# Patient Record
Sex: FEMALE | Race: WHITE | Employment: UNEMPLOYED | URBAN - METROPOLITAN AREA
[De-identification: names, ages, dates, MRNs, and addresses within clinical notes are randomized per-mention and may not be internally consistent; named-entity substitution may affect disease eponyms.]

---

## 2017-05-03 ENCOUNTER — APPOINTMENT (OUTPATIENT)
Dept: GENERAL RADIOLOGY | Age: 27
End: 2017-05-03
Attending: NURSE PRACTITIONER
Payer: SELF-PAY

## 2017-05-03 ENCOUNTER — HOSPITAL ENCOUNTER (EMERGENCY)
Age: 27
Discharge: HOME | End: 2017-05-03
Payer: SELF-PAY

## 2017-05-03 VITALS
WEIGHT: 210 LBS | DIASTOLIC BLOOD PRESSURE: 94 MMHG | RESPIRATION RATE: 20 BRPM | BODY MASS INDEX: 34.99 KG/M2 | TEMPERATURE: 97.4 F | HEIGHT: 65 IN | OXYGEN SATURATION: 100 % | SYSTOLIC BLOOD PRESSURE: 134 MMHG | HEART RATE: 99 BPM

## 2017-05-03 DIAGNOSIS — S61.511A LACERATION OF WRIST, RIGHT, INITIAL ENCOUNTER: Primary | ICD-10-CM

## 2017-05-03 PROCEDURE — 73110 X-RAY EXAM OF WRIST: CPT

## 2017-05-03 PROCEDURE — 77030002916 HC SUT ETHLN J&J -A

## 2017-05-03 PROCEDURE — 75810000293 HC SIMP/SUPERF WND  RPR

## 2017-05-03 PROCEDURE — 99282 EMERGENCY DEPT VISIT SF MDM: CPT

## 2017-05-03 PROCEDURE — 74011250637 HC RX REV CODE- 250/637: Performed by: NURSE PRACTITIONER

## 2017-05-03 RX ORDER — IBUPROFEN 800 MG/1
800 TABLET ORAL
Status: COMPLETED | OUTPATIENT
Start: 2017-05-03 | End: 2017-05-03

## 2017-05-03 RX ORDER — CEPHALEXIN 500 MG/1
500 CAPSULE ORAL 2 TIMES DAILY
Qty: 10 CAP | Refills: 0 | Status: SHIPPED | OUTPATIENT
Start: 2017-05-03 | End: 2017-05-08

## 2017-05-03 RX ADMIN — IBUPROFEN 800 MG: 800 TABLET ORAL at 16:14

## 2017-05-03 NOTE — LETTER
[facility]77 [facility] [place] [address] [id number] [phone] Work/School Note Date: 5/3/2017 To Whom It May concern: 
 
Dallas Brain was seen and treated today in the emergency room by the following provider(s): 
Attending Provider: Roseanna Sood MD 
Nurse Practitioner: ANDREW Aguilar. Dallas Brain was seen in the Emergency Department 05/03/2017.  
 
Sincerely, 
 
 
 
 
ANDREW Aguilar

## 2017-05-03 NOTE — ED PROVIDER NOTES
HPI Comments: Patient presents with laceration to her right wrist. She states she was trying to open a window when it broke and her hand went through the glass. She has laceration to right wrist bleeding controlled. She denies numbness and tingling in her fingers. She has full range of motion without difficulty in all fingers. Unknown when her last tetanus shot was. Patient is a 27 y.o. female presenting with skin laceration. The history is provided by the patient. Laceration    The incident occurred 1 to 2 hours ago. The laceration is located on the right arm. The laceration is 1 cm in size. The injury mechanism is broken glass. Foreign body present: unknown. The pain is at a severity of 5/10. The pain is mild. The pain has been fluctuating since onset. Pertinent negatives include no numbness, no tingling, no weakness, no coolness and no discoloration. It is unknown when the patient last had a tetanus shot. Past Medical History:   Diagnosis Date    Acute pancreatitis     Anxiety     Placenta abruptio     Occurred with her last pregnancy; Pt reports bleeding x 5 months during her pregnancy; Required blood transfusion.  Polysubstance abuse     marijuana and methamphetamine use    Tobacco dependence     UTI (urinary tract infection)        Past Surgical History:   Procedure Laterality Date    HX  SECTION      x4    HX CHOLECYSTECTOMY      HX TONSILLECTOMY      HX TUBAL LIGATION           Family History:   Problem Relation Age of Onset    Diabetes Other     Clotting Disorder Other      History of blood clot in mother and maternal uncle.  Hypertension Other        Social History     Social History    Marital status: SINGLE     Spouse name: N/A    Number of children: N/A    Years of education: N/A     Occupational History    Not on file.      Social History Main Topics    Smoking status: Current Every Day Smoker     Packs/day: 1.00     Years: 9.00     Types: Cigarettes    Smokeless tobacco: Never Used    Alcohol use Yes      Comment: Reports occasional use    Drug use: 1.00 per week     Special: Marijuana, Methamphetamines    Sexual activity: Yes     Partners: Male     Birth control/ protection: Surgical     Other Topics Concern    Not on file     Social History Narrative         ALLERGIES: Macrobid [nitrofurantoin monohyd/m-cryst]    Review of Systems   Constitutional: Negative for fever. Respiratory: Negative for cough and wheezing. Cardiovascular: Negative for chest pain. Gastrointestinal: Negative for abdominal pain. Skin: Positive for wound. Neurological: Negative for dizziness, tingling, weakness and numbness. Psychiatric/Behavioral: Negative for suicidal ideas. The patient is nervous/anxious. Vitals:    05/03/17 1548   BP: (!) 134/94   Pulse: 99   Resp: 20   Temp: 97.4 °F (36.3 °C)   SpO2: 100%   Weight: 95.3 kg (210 lb)   Height: 5' 5\" (1.651 m)            Physical Exam   Constitutional: She is oriented to person, place, and time. No distress. Cardiovascular: Normal rate, regular rhythm and normal heart sounds. No murmur heard. Pulmonary/Chest: Effort normal and breath sounds normal.   Musculoskeletal:        Right wrist: She exhibits tenderness and laceration. She exhibits normal range of motion, no swelling, no effusion and no deformity. Arms:       Right hand: Normal. Normal sensation noted. Normal strength noted. Neurological: She is alert and oriented to person, place, and time. No cranial nerve deficit. Coordination normal.   Skin: She is not diaphoretic. Psychiatric: She has a normal mood and affect.  Her behavior is normal.   Nursing note and vitals reviewed.       XR WRIST RT AP/LAT/OBL MIN 3V (Final result) Result time: 05/03/17 16:44:44     Final result by Gordon Corona MD (05/03/17 16:44:44)     Impression:     Impression:  No evidence of acute injury.         Narrative:     Exam:  Right wrist radiographs    History:  pain, laceration foreign body?, 26 years Female laceration to right  wrist after bumping a window out    Comparison: None available    Findings:  No evidence of acute fracture or dislocation.  Normal alignment,  joint spaces preserved.  Normal mineralization.  Visualized soft tissues  otherwise unremarkable. MDM  Number of Diagnoses or Management Options  Laceration of wrist, right, initial encounter: new and does not require workup  Diagnosis management comments: No foreign body noted on xray. Sutures placed. Dressing applied. Patient given ibuprofen for pain. Patient discharged with prescription for keflex. Return in 7 days to have sutures removed. Amount and/or Complexity of Data Reviewed  Tests in the radiology section of CPT®: ordered and reviewed  Tests in the medicine section of CPT®: ordered and reviewed    Patient Progress  Patient progress: stable    ED Course       Wound Repair  Date/Time: 5/3/2017 5:24 PM  Performed by: [address] [place] [address] [address] provider: angela  Pre-procedure re-eval: Immediately prior to the procedure, the patient was reevaluated and found suitable for the planned procedure and any planned medications. Time out: Immediately prior to the procedure a time out was called to verify the correct patient, procedure, equipment, staff and marking as appropriate. .  Location details: right wrist  Wound length:2.5 cm or less  Anesthesia: local infiltration    Anesthesia:  Anesthesia: local infiltration  Local Anesthetic: lidocaine 1% without epinephrine   Anesthetic total: 2 mL  Foreign bodies: no foreign bodies  Irrigation solution: saline  Irrigation method: syringe  Debridement: none  Skin closure: 4-0 nylon  Number of sutures: 3  Technique: simple  Approximation: close  Patient tolerance: Patient tolerated the procedure well with no immediate complications  My total time at bedside, performing this procedure was 1-15 minutes.

## 2018-07-30 ENCOUNTER — HOSPITAL ENCOUNTER (EMERGENCY)
Age: 28
Discharge: HOME | End: 2018-07-30
Attending: EMERGENCY MEDICINE
Payer: SELF-PAY

## 2018-07-30 VITALS
TEMPERATURE: 97.9 F | BODY MASS INDEX: 30.82 KG/M2 | OXYGEN SATURATION: 99 % | WEIGHT: 185 LBS | RESPIRATION RATE: 20 BRPM | HEART RATE: 99 BPM | DIASTOLIC BLOOD PRESSURE: 99 MMHG | SYSTOLIC BLOOD PRESSURE: 165 MMHG | HEIGHT: 65 IN

## 2018-07-30 DIAGNOSIS — N75.1 BARTHOLIN'S GLAND ABSCESS: Primary | ICD-10-CM

## 2018-07-30 LAB
APPEARANCE UR: NORMAL
BILIRUB UR QL: NEGATIVE
COLOR UR: YELLOW
GLUCOSE UR STRIP.AUTO-MCNC: NEGATIVE MG/DL
HCG UR QL: NEGATIVE
HGB UR QL STRIP: NEGATIVE
KETONES UR QL STRIP.AUTO: NEGATIVE MG/DL
LEUKOCYTE ESTERASE UR QL STRIP.AUTO: NEGATIVE
NITRITE UR QL STRIP.AUTO: NEGATIVE
PH UR STRIP: 6.5 [PH] (ref 5–9)
PROT UR STRIP-MCNC: NEGATIVE MG/DL
SP GR UR REFRACTOMETRY: 1.02 (ref 1–1.02)
UROBILINOGEN UR QL STRIP.AUTO: 0.2 EU/DL (ref 0.2–1)

## 2018-07-30 PROCEDURE — 75810000113 HC INC/DRN BARHOLIN GLND ABCESS: Performed by: PHYSICIAN ASSISTANT

## 2018-07-30 PROCEDURE — 87077 CULTURE AEROBIC IDENTIFY: CPT

## 2018-07-30 PROCEDURE — 74011250637 HC RX REV CODE- 250/637: Performed by: PHYSICIAN ASSISTANT

## 2018-07-30 PROCEDURE — 74011250636 HC RX REV CODE- 250/636: Performed by: PHYSICIAN ASSISTANT

## 2018-07-30 PROCEDURE — 87186 SC STD MICRODIL/AGAR DIL: CPT | Performed by: PHYSICIAN ASSISTANT

## 2018-07-30 PROCEDURE — 96372 THER/PROPH/DIAG INJ SC/IM: CPT | Performed by: PHYSICIAN ASSISTANT

## 2018-07-30 PROCEDURE — 99284 EMERGENCY DEPT VISIT MOD MDM: CPT | Performed by: PHYSICIAN ASSISTANT

## 2018-07-30 PROCEDURE — 87205 SMEAR GRAM STAIN: CPT | Performed by: PHYSICIAN ASSISTANT

## 2018-07-30 PROCEDURE — 77030012142 HC CATH BARTH WORD COOP -B

## 2018-07-30 PROCEDURE — 81025 URINE PREGNANCY TEST: CPT | Performed by: PHYSICIAN ASSISTANT

## 2018-07-30 PROCEDURE — 81003 URINALYSIS AUTO W/O SCOPE: CPT | Performed by: PHYSICIAN ASSISTANT

## 2018-07-30 RX ORDER — KETOROLAC TROMETHAMINE 30 MG/ML
60 INJECTION, SOLUTION INTRAMUSCULAR; INTRAVENOUS
Status: COMPLETED | OUTPATIENT
Start: 2018-07-30 | End: 2018-07-30

## 2018-07-30 RX ORDER — ONDANSETRON 4 MG/1
4 TABLET, ORALLY DISINTEGRATING ORAL
Status: COMPLETED | OUTPATIENT
Start: 2018-07-30 | End: 2018-07-30

## 2018-07-30 RX ORDER — DOXYCYCLINE HYCLATE 100 MG
100 TABLET ORAL 2 TIMES DAILY
Qty: 20 TAB | Refills: 0 | Status: SHIPPED | OUTPATIENT
Start: 2018-07-30 | End: 2018-08-01 | Stop reason: ALTCHOICE

## 2018-07-30 RX ORDER — TRAMADOL HYDROCHLORIDE 50 MG/1
50 TABLET ORAL
Qty: 15 TAB | Refills: 0 | Status: SHIPPED | OUTPATIENT
Start: 2018-07-30

## 2018-07-30 RX ORDER — IBUPROFEN 600 MG/1
600 TABLET ORAL
Qty: 20 TAB | Refills: 0 | Status: SHIPPED | OUTPATIENT
Start: 2018-07-30

## 2018-07-30 RX ADMIN — ONDANSETRON 4 MG: 4 TABLET, ORALLY DISINTEGRATING ORAL at 11:50

## 2018-07-30 RX ADMIN — KETOROLAC TROMETHAMINE 60 MG: 30 INJECTION, SOLUTION INTRAMUSCULAR at 11:50

## 2018-07-30 NOTE — ED PROVIDER NOTES
HPI Comments: Patient is here with a boil to her left perineal area that started over a week ago. She has had one there in the past that has been lanced. She states \"they usually give me a shot for pain before they do it. \"  Patient states that somebody dropped her off and will be picking her up. She has not had a fever, nausea, vomiting, chest pain, shortness of breath, abdominal pain, trouble with urination or bowel movements, vaginal discharge or other symptoms. She was ambulatory to the room without difficulty and well hydrated. Patient does smoke. Patient is a 32 y.o. female presenting with abscess. The history is provided by the patient. Abscess This is a new problem. The current episode started more than 1 week ago. The problem has been gradually worsening. The problem is associated with an unknown factor. There has been no fever. Affected Location: perineal area. The pain is at a severity of 10/10. The pain is severe. The pain has been constant since onset. Associated symptoms include pain. She has tried nothing for the symptoms. Past Medical History:  
Diagnosis Date  Acute pancreatitis  Anxiety  Placenta abruptio  Occurred with her last pregnancy; Pt reports bleeding x 5 months during her pregnancy; Required blood transfusion.  Polysubstance abuse   
 marijuana and methamphetamine use  Tobacco dependence  UTI (urinary tract infection) Past Surgical History:  
Procedure Laterality Date  HX  SECTION    
 x4  
 HX CHOLECYSTECTOMY  HX TONSILLECTOMY  HX TUBAL LIGATION Family History:  
Problem Relation Age of Onset  Diabetes Other  Clotting Disorder Other History of blood clot in mother and maternal uncle.  Hypertension Other Social History Social History  Marital status: SINGLE Spouse name: N/A  
 Number of children: N/A  
 Years of education: N/A Occupational History  Not on file. Social History Main Topics  Smoking status: Current Every Day Smoker Packs/day: 1.00 Years: 9.00 Types: Cigarettes  Smokeless tobacco: Never Used  Alcohol use Yes Comment: Reports occasional use  Drug use: 1.00 per week Special: Marijuana, Methamphetamines  Sexual activity: Yes  
  Partners: Male Birth control/ protection: Surgical  
 
Other Topics Concern  Not on file Social History Narrative ALLERGIES: Macrobid [nitrofurantoin monohyd/m-cryst] Review of Systems Constitutional: Negative. HENT: Negative. Eyes: Negative. Respiratory: Negative. Cardiovascular: Negative. Gastrointestinal: Negative. Genitourinary: Negative. Musculoskeletal: Negative. Skin: Positive for color change. Neurological: Negative. Psychiatric/Behavioral: Negative. All other systems reviewed and are negative. Vitals:  
 07/30/18 1037 BP: (!) 157/105 Pulse: (!) 107 Resp: 20 Temp: 97.9 °F (36.6 °C) SpO2: 99% Weight: 83.9 kg (185 lb) Height: 5' 5\" (1.651 m) Physical Exam  
Constitutional: She is oriented to person, place, and time. She appears well-developed and well-nourished. HENT:  
Head: Normocephalic and atraumatic. Right Ear: External ear normal.  
Left Ear: External ear normal.  
Nose: Nose normal.  
Mouth/Throat: Oropharynx is clear and moist.  
Eyes: Conjunctivae and EOM are normal. Pupils are equal, round, and reactive to light. Neck: Normal range of motion. Neck supple. Cardiovascular: Normal rate, regular rhythm, normal heart sounds and intact distal pulses. Pulmonary/Chest: Effort normal and breath sounds normal.  
Abdominal: Soft. Bowel sounds are normal.  
Genitourinary:  
 
 
 
Genitourinary Comments: COLETTE Steward into assist with exam and I&D Musculoskeletal: Normal range of motion. Neurological: She is alert and oriented to person, place, and time. She has normal reflexes.   
Skin: Skin is warm and dry. Psychiatric: She has a normal mood and affect. Her behavior is normal. Judgment and thought content normal.  
Nursing note and vitals reviewed. MDM Number of Diagnoses or Management Options Bartholin's gland abscess:  
  
Amount and/or Complexity of Data Reviewed Clinical lab tests: ordered and reviewed Risk of Complications, Morbidity, and/or Mortality Presenting problems: moderate Diagnostic procedures: moderate Management options: moderate Patient Progress Patient progress: improved ED Course I&D Abcess Simple Date/Time: 7/30/2018 1:16 PM 
Performed by: Fabián Taylor Authorized by: Fabián Taylor Consent:  
  Consent obtained:  Verbal 
  Consent given by:  Patient Risks discussed:  Bleeding, incomplete drainage, pain, damage to other organs and infection Alternatives discussed:  No treatment Location:  
  Type:  Bartholin cyst 
  Location:  Anogenital 
  Anogenital location:  Bartholin's gland Pre-procedure details:  
  Skin preparation:  Betadine Anesthesia (see MAR for exact dosages): Anesthesia method:  Local infiltration Local anesthetic:  Lidocaine 1% w/o epi Procedure type:  
  Complexity:  Simple Procedure details:  
  Needle aspiration: no Incision types:  Single straight Scalpel blade:  11 Wound management:  Probed and deloculated and irrigated with saline Drainage:  Purulent and bloody Drainage amount:  Copious Wound treatment:  Wound left open and drain placed Packing materials: Word catheter Post-procedure details:  
  Patient tolerance of procedure: Tolerated well, no immediate complications The patient was observed in the ED. Results Reviewed: 
 
 
Recent Results (from the past 24 hour(s)) URINALYSIS W/ RFLX MICROSCOPIC Collection Time: 07/30/18 11:46 AM  
Result Value Ref Range Color YELLOW Appearance CLOUDY  Specific gravity 1.020 1.001 - 1.023    
 pH (UA) 6.5 5.0 - 9.0 Protein NEGATIVE  NEG mg/dL Glucose NEGATIVE  mg/dL Ketone NEGATIVE  NEG mg/dL Bilirubin NEGATIVE  NEG Blood NEGATIVE  NEG Urobilinogen 0.2 0.2 - 1.0 EU/dL Nitrites NEGATIVE  NEG Leukocyte Esterase NEGATIVE  NEG    
HCG URINE, QL Collection Time: 07/30/18 11:46 AM  
Result Value Ref Range HCG urine, QL NEGATIVE  NEG Patient requested a shot for pain before doing the procedure. I have given her a shot of Toradol and we waited for that to take effect which she said it did. A Word catheter was placed and she was asked to return in 2-3 days for catheter removal.  Patient was written a prescription for doxycycline, tramadol and ibuprofen for the pain. She should wash the area twice daily with soap and water, blot dry and return for catheter removal.  She was referred to gynecology for definitive care. She is stable for discharge and has a ride home. She felt better after the procedure. Keep a check on BP, check it once or twice weekly with same blood pressure monitor, write the number down with date and time and take that with you to see a PCP for recheck of BP. Patient referred to [facility] for BP. I discussed the results of all labs, procedures, radiographs, and treatments with the patient and available family. Treatment plan is agreed upon and the patient is ready for discharge. All voiced understanding of the discharge plan and medication instructions or changes as appropriate. Questions about treatment in the ED were answered. All were encouraged to return should symptoms worsen or new problems develop.

## 2018-07-30 NOTE — LETTER
8/1/2018 [address] [address] Dear Ms. Cody Jorge, You were recently seen in the Emergency Department of [facility] and had blood work or x-rays performed. We would like to discuss these with you. Please call the Emergency Department at your earliest convenience. If you were seen at [facility], please dial [phone], and if you were seen at [facility], please call [phone] to speak with one of our providers. Sincerely, Ernesto Maldonado MD 
Medical Director Emergency Services, [address] [place] [address]  
[phone]/ [phone]

## 2018-07-30 NOTE — DISCHARGE INSTRUCTIONS
Bartholin Gland Cyst: Care Instructions  Your Care Instructions    The Bartholin glands are in a woman's vulva. This is the area around the vagina. The glands are normally about the size of a pea. They provide fluid to the vulvar area through a small opening. If the opening is blocked, the gland swells with fluid and forms a cyst. You can have a cyst for years with no symptoms. But if a cyst gets infected by bacteria, it can grow and become red and painful. This is called an abscess. Opening and draining the cyst usually cures the infection. You may have had a small tube (catheter) placed into the cyst or had minor surgery to let the cyst drain. The tube will usually be left in for at least 4 weeks. Your doctor may do a lab test to find out what kind of bacteria caused the infection. You may get antibiotics to kill the bacteria. You may have some drainage from the cyst for a few weeks. The gland should return to normal after the infection clears up. Follow-up care is a key part of your treatment and safety. Be sure to make and go to all appointments, and call your doctor if you are having problems. It's also a good idea to know your test results and keep a list of the medicines you take. How can you care for yourself at home? · If your doctor prescribed antibiotics, take them as directed. Do not stop taking them just because you feel better. You need to take the full course of antibiotics. · Sit in a few inches of warm water (sitz bath) 3 times a day and after bowel movements. The warm water helps the area heal and eases discomfort. · Take an over-the-counter pain medicine such as acetaminophen (Tylenol), ibuprofen (Advil, Motrin), or naproxen (Aleve). Read and follow all instructions on the label. · Do not take two or more pain medicines at the same time unless the doctor told you to. Many pain medicines have acetaminophen, which is Tylenol. Too much acetaminophen (Tylenol) can be harmful.   · Wear panty liners or pads if you have discharge from the draining cyst.  · If you are sexually active, avoid sex until:  ¨ You have finished the antibiotics. ¨ The area has healed. · If you had a catheter placed in the cyst to help it drain, follow your doctor's instructions for activities until the tube comes out. When should you call for help? Call your doctor now or seek immediate medical care if:    · You have symptoms of a new or worse infection, such as:  ¨ Increased pain, swelling, warmth, or redness. ¨ Red streaks leading from the area. ¨ Pus draining from the area. ¨ A fever.    Watch closely for changes in your health, and be sure to contact your doctor if:    · The catheter falls out.     · You are not getting better as expected. Where can you learn more? Go to http://raza-vangie.info/. Enter H276 in the search box to learn more about \"Bartholin Gland Cyst: Care Instructions. \"  Current as of: October 6, 2017  Content Version: 11.7  © 2908-3131 [employer]. Care instructions adapted under license by [employer] (which disclaims liability or warranty for this information). If you have questions about a medical condition or this instruction, always ask your healthcare professional. [address] any warranty or liability for your use of this information.

## 2018-07-30 NOTE — LETTER
3777 [facility] [place] [place] [facility] [address] [id number] [phone] Work/School Note Date: 7/30/2018 To Whom It May concern: 
 
Shyla Burt was seen and treated today in the emergency room by the following provider(s): 
Attending Provider: Cary Shane MD 
Physician Assistant: HENOK Lao. Shyla Burt may return to work on 08/02/18. Sincerely, HENOK Lao

## 2018-07-30 NOTE — ED TRIAGE NOTES
Patient reports a \"boil down there\" as she points to her pelvic region. States that this has been a problem for 1 week.

## 2018-07-30 NOTE — ED NOTES
I have reviewed discharge instructions with the patient. The patient verbalized understanding. Patient left ED via Discharge Method: ambulatory to Home with friend. Opportunity for questions and clarification provided. Patient given 3 scripts. To continue your aftercare when you leave the hospital, you may receive an automated call from our care team to check in on how you are doing. This is a free service and part of our promise to provide the best care and service to meet your aftercare needs.  If you have questions, or wish to unsubscribe from this service please call [phone]. Thank you for Choosing our [facility] Emergency Department.

## 2018-07-30 NOTE — Clinical Note
Gently wash the area twice daily with soap and water, blot dry and return in 2-3 days for catheter removal.  Finish all of the antibiotics. Follow up with the gynecologist for definitive care.

## 2018-08-01 ENCOUNTER — HOSPITAL ENCOUNTER (EMERGENCY)
Age: 28
Discharge: HOME | End: 2018-08-01
Attending: EMERGENCY MEDICINE
Payer: SELF-PAY

## 2018-08-01 VITALS
SYSTOLIC BLOOD PRESSURE: 153 MMHG | RESPIRATION RATE: 20 BRPM | DIASTOLIC BLOOD PRESSURE: 114 MMHG | OXYGEN SATURATION: 99 % | TEMPERATURE: 98.3 F | HEIGHT: 65 IN | BODY MASS INDEX: 30.82 KG/M2 | WEIGHT: 185 LBS | HEART RATE: 85 BPM

## 2018-08-01 DIAGNOSIS — Z51.89 WOUND CHECK, ABSCESS: Primary | ICD-10-CM

## 2018-08-01 DIAGNOSIS — I10 ESSENTIAL HYPERTENSION: ICD-10-CM

## 2018-08-01 PROCEDURE — 99282 EMERGENCY DEPT VISIT SF MDM: CPT | Performed by: EMERGENCY MEDICINE

## 2018-08-01 RX ORDER — LISINOPRIL 10 MG/1
10 TABLET ORAL DAILY
Qty: 30 TAB | Refills: 0 | OUTPATIENT
Start: 2018-08-01 | End: 2020-11-01

## 2018-08-01 RX ORDER — AMOXICILLIN AND CLAVULANATE POTASSIUM 875; 125 MG/1; MG/1
1 TABLET, FILM COATED ORAL 2 TIMES DAILY
Qty: 14 TAB | Refills: 0 | Status: SHIPPED | OUTPATIENT
Start: 2018-08-01

## 2018-08-01 NOTE — ED NOTES
Patients drain removed. Patient reports that she was unable to afford the medications prescribed to her and is requesting additional medications. Dr oLvett Moh aware.

## 2018-08-01 NOTE — ED PROVIDER NOTES
HPI Comments: Patient presents to emergency department complaining of discomfort secondary to the Jesus catheter was placed Approximately 36 hours ago and requests to have it removed. Patient denies any UTI symptoms and states she needs to go back to work and it hurts too much to walk With the catheter in place. She denies fever or chills. The history is provided by the patient. Past Medical History:  
Diagnosis Date  Acute pancreatitis  Anxiety  Placenta abruptio 2010 Occurred with her last pregnancy; Pt reports bleeding x 5 months during her pregnancy; Required blood transfusion.  Polysubstance abuse   
 marijuana and methamphetamine use  Tobacco dependence  UTI (urinary tract infection) Past Surgical History:  
Procedure Laterality Date  HX  SECTION    
 x4  
 HX CHOLECYSTECTOMY  HX TONSILLECTOMY  HX TUBAL LIGATION Family History:  
Problem Relation Age of Onset  Diabetes Other  Clotting Disorder Other History of blood clot in mother and maternal uncle.  Hypertension Other Social History Social History  Marital status: SINGLE Spouse name: N/A  
 Number of children: N/A  
 Years of education: N/A Occupational History  Not on file. Social History Main Topics  Smoking status: Current Every Day Smoker Packs/day: 1.00 Years: 9.00 Types: Cigarettes  Smokeless tobacco: Never Used  Alcohol use Yes Comment: Reports occasional use  Drug use: 1.00 per week Special: Marijuana, Methamphetamines  Sexual activity: Yes  
  Partners: Male Birth control/ protection: Surgical  
 
Other Topics Concern  Not on file Social History Narrative ALLERGIES: Macrobid [nitrofurantoin monohyd/m-cryst] Review of Systems Constitutional: Negative for chills and fever. Respiratory: Negative for cough and shortness of breath.    
All other systems reviewed and are negative. Vitals:  
 08/01/18 5451 BP: (!) 153/114 Pulse: 85 Resp: 20 Temp: 98.3 °F (36.8 °C) SpO2: 99% Weight: 83.9 kg (185 lb) Height: 5' 5\" (1.651 m) Physical Exam  
Constitutional: She is oriented to person, place, and time. She appears well-developed and well-nourished. No distress. HENT:  
Head: Normocephalic and atraumatic. Right Ear: Tympanic membrane and external ear normal.  
Left Ear: Tympanic membrane and external ear normal.  
Mouth/Throat: Oropharynx is clear and moist.  
Eyes: Conjunctivae and EOM are normal. Pupils are equal, round, and reactive to light. Neck: Normal range of motion. Neck supple. No tracheal deviation present. Cardiovascular: Normal rate, regular rhythm, normal heart sounds and intact distal pulses. Exam reveals no gallop and no friction rub. No murmur heard. Pulmonary/Chest: Effort normal and breath sounds normal. No respiratory distress. She has no wheezes. Abdominal: Soft. Bowel sounds are normal. She exhibits no distension and no mass. There is no hepatosplenomegaly. There is no tenderness. There is no rebound and no guarding. Musculoskeletal: Normal range of motion. Lymphadenopathy:  
  She has no cervical adenopathy. Neurological: She is alert and oriented to person, place, and time. Skin: Skin is warm and dry. She is not diaphoretic. Psychiatric: She has a normal mood and affect. Nursing note and vitals reviewed. MDM Number of Diagnoses or Management Options Essential hypertension: new and does not require workup Wound check, abscess: new and does not require workup Amount and/or Complexity of Data Reviewed Decide to obtain previous medical records or to obtain history from someone other than the patient: yes Review and summarize past medical records: yes Risk of Complications, Morbidity, and/or Mortality Presenting problems: low Diagnostic procedures: minimal 
Management options: low Patient Progress Patient progress: stable ED Course Procedures I tried to explain to the patient, that the patient should have the catheter in for 4-6 weeks. Patient insisted on the catheter being removed today, which was done Without complication.

## 2018-08-01 NOTE — DISCHARGE INSTRUCTIONS
Learning About High Blood Pressure  What is high blood pressure? Blood pressure is a measure of how hard the blood pushes against the walls of your arteries. It's normal for blood pressure to go up and down throughout the day, but if it stays up, you have high blood pressure. Another name for high blood pressure is hypertension. Two numbers tell you your blood pressure. The first number is the systolic pressure. It shows how hard the blood pushes when your heart is pumping. The second number is the diastolic pressure. It shows how hard the blood pushes between heartbeats, when your heart is relaxed and filling with blood. A blood pressure of less than 120/80 (say \"120 over 80\") is ideal for an adult. High blood pressure is 130/80 or higher. You have high blood pressure if your top number is 130 or higher or your bottom number is 80 or higher, or both. What happens when you have high blood pressure? · Blood flows through your arteries with too much force. Over time, this damages the walls of your arteries. But you can't feel it. High blood pressure usually doesn't cause symptoms. · Fat and calcium start to build up in your arteries. This buildup is called plaque. Plaque makes your arteries narrower and stiffer. Blood can't flow through them as easily. · This lack of good blood flow starts to damage some of the organs in your body. This can lead to problems such as coronary artery disease and heart attack, heart failure, stroke, kidney failure, and eye damage. How can you prevent high blood pressure? · Stay at a healthy weight. · Try to limit how much sodium you eat to less than 2,300 milligrams (mg) a day. If you limit your sodium to 1,500 mg a day, you can lower your blood pressure even more. ¨ Buy foods that are labeled \"unsalted,\" \"sodium-free,\" or \"low-sodium. \" Foods labeled \"reduced-sodium\" and \"light sodium\" may still have too much sodium.   ¨ Flavor your food with garlic, lemon juice, onion, vinegar, herbs, and spices instead of salt. Do not use soy sauce, steak sauce, onion salt, garlic salt, mustard, or ketchup on your food. ¨ Use less salt (or none) when recipes call for it. You can often use half the salt a recipe calls for without losing flavor. · Be physically active. Get at least 30 minutes of exercise on most days of the week. Walking is a good choice. You also may want to do other activities, such as running, swimming, cycling, or playing tennis or team sports. · Limit alcohol to 2 drinks a day for men and 1 drink a day for women. · Eat plenty of fruits, vegetables, and low-fat dairy products. Eat less saturated and total fats. How is high blood pressure treated? · Your doctor will suggest making lifestyle changes. For example, your doctor may ask you to eat healthy foods, quit smoking, lose extra weight, and be more active. · If lifestyle changes don't help enough or your blood pressure is very high, you will have to take medicine every day. Follow-up care is a key part of your treatment and safety. Be sure to make and go to all appointments, and call your doctor if you are having problems. It's also a good idea to know your test results and keep a list of the medicines you take. Where can you learn more? Go to http://raza-vangie.info/. Enter P501 in the search box to learn more about \"Learning About High Blood Pressure. \"  Current as of: May 10, 2017  Content Version: 11.7  © 4905-1605 [employer]. Care instructions adapted under license by [employer]) (which disclaims liability or warranty for this information). If you have questions about a medical condition or this instruction, always ask your healthcare professional. [place] [address] any warranty or liability for your use of this information. Bartholin Gland Cyst: Care Instructions  Your Care Instructions    The Bartholin glands are in a woman's vulva.  This is the area around the vagina. The glands are normally about the size of a pea. They provide fluid to the vulvar area through a small opening. If the opening is blocked, the gland swells with fluid and forms a cyst. You can have a cyst for years with no symptoms. But if a cyst gets infected by bacteria, it can grow and become red and painful. This is called an abscess. Opening and draining the cyst usually cures the infection. You may have had a small tube (catheter) placed into the cyst or had minor surgery to let the cyst drain. The tube will usually be left in for at least 4 weeks. Your doctor may do a lab test to find out what kind of bacteria caused the infection. You may get antibiotics to kill the bacteria. You may have some drainage from the cyst for a few weeks. The gland should return to normal after the infection clears up. Follow-up care is a key part of your treatment and safety. Be sure to make and go to all appointments, and call your doctor if you are having problems. It's also a good idea to know your test results and keep a list of the medicines you take. How can you care for yourself at home? · If your doctor prescribed antibiotics, take them as directed. Do not stop taking them just because you feel better. You need to take the full course of antibiotics. · Sit in a few inches of warm water (sitz bath) 3 times a day and after bowel movements. The warm water helps the area heal and eases discomfort. · Take an over-the-counter pain medicine such as acetaminophen (Tylenol), ibuprofen (Advil, Motrin), or naproxen (Aleve). Read and follow all instructions on the label. · Do not take two or more pain medicines at the same time unless the doctor told you to. Many pain medicines have acetaminophen, which is Tylenol. Too much acetaminophen (Tylenol) can be harmful.   · Wear panty liners or pads if you have discharge from the draining cyst.  · If you are sexually active, avoid sex until:  ¨ You have finished the antibiotics. ¨ The area has healed. · If you had a catheter placed in the cyst to help it drain, follow your doctor's instructions for activities until the tube comes out. When should you call for help? Call your doctor now or seek immediate medical care if:    · You have symptoms of a new or worse infection, such as:  ¨ Increased pain, swelling, warmth, or redness. ¨ Red streaks leading from the area. ¨ Pus draining from the area. ¨ A fever.    Watch closely for changes in your health, and be sure to contact your doctor if:    · The catheter falls out.     · You are not getting better as expected. Where can you learn more? Go to http://raza-vangie.info/. Enter H276 in the search box to learn more about \"Bartholin Gland Cyst: Care Instructions. \"  Current as of: October 6, 2017  Content Version: 11.7  © 2353-1340 [employer]. Care instructions adapted under license by [employer] (which disclaims liability or warranty for this information). If you have questions about a medical condition or this instruction, always ask your healthcare professional. [address] any warranty or liability for your use of this information.

## 2018-08-02 LAB
BACTERIA SPEC CULT: ABNORMAL
GRAM STN SPEC: ABNORMAL
GRAM STN SPEC: ABNORMAL
SERVICE CMNT-IMP: ABNORMAL

## 2020-11-01 ENCOUNTER — HOSPITAL ENCOUNTER (EMERGENCY)
Age: 30
Discharge: HOME | End: 2020-11-01
Attending: EMERGENCY MEDICINE

## 2020-11-01 VITALS
SYSTOLIC BLOOD PRESSURE: 171 MMHG | BODY MASS INDEX: 46.65 KG/M2 | OXYGEN SATURATION: 99 % | HEART RATE: 74 BPM | WEIGHT: 280 LBS | DIASTOLIC BLOOD PRESSURE: 95 MMHG | HEIGHT: 65 IN | RESPIRATION RATE: 16 BRPM | TEMPERATURE: 97.9 F

## 2020-11-01 DIAGNOSIS — I10 HYPERTENSION, UNSPECIFIED TYPE: ICD-10-CM

## 2020-11-01 DIAGNOSIS — R60.9 EDEMA, UNSPECIFIED TYPE: Primary | ICD-10-CM

## 2020-11-01 LAB
ALBUMIN SERPL-MCNC: 3.5 G/DL (ref 3.5–5)
ALBUMIN/GLOB SERPL: 0.9 {RATIO} (ref 1.2–3.5)
ALP SERPL-CCNC: 70 U/L (ref 50–130)
ALT SERPL-CCNC: 18 U/L (ref 12–65)
ANION GAP SERPL CALC-SCNC: 7 MMOL/L (ref 7–16)
AST SERPL-CCNC: 14 U/L (ref 15–37)
BASOPHILS # BLD: 0.1 K/UL (ref 0–0.2)
BASOPHILS NFR BLD: 1 % (ref 0–2)
BILIRUB SERPL-MCNC: 0.2 MG/DL (ref 0.2–1.1)
BNP SERPL-MCNC: 55 PG/ML (ref 5–125)
BUN SERPL-MCNC: 20 MG/DL (ref 6–23)
CALCIUM SERPL-MCNC: 8.5 MG/DL (ref 8.3–10.4)
CHLORIDE SERPL-SCNC: 110 MMOL/L (ref 98–107)
CO2 SERPL-SCNC: 25 MMOL/L (ref 21–32)
CREAT SERPL-MCNC: 0.86 MG/DL (ref 0.6–1)
DIFFERENTIAL METHOD BLD: ABNORMAL
EOSINOPHIL # BLD: 0.2 K/UL (ref 0–0.8)
EOSINOPHIL NFR BLD: 3 % (ref 0.5–7.8)
ERYTHROCYTE [DISTWIDTH] IN BLOOD BY AUTOMATED COUNT: 16.1 % (ref 11.9–14.6)
GLOBULIN SER CALC-MCNC: 3.7 G/DL (ref 2.3–3.5)
GLUCOSE SERPL-MCNC: 109 MG/DL (ref 65–100)
HCT VFR BLD AUTO: 32.5 % (ref 35.8–46.3)
HGB BLD-MCNC: 9.9 G/DL (ref 11.7–15.4)
IMM GRANULOCYTES # BLD AUTO: 0 K/UL (ref 0–0.5)
IMM GRANULOCYTES NFR BLD AUTO: 0 % (ref 0–5)
LYMPHOCYTES # BLD: 2.4 K/UL (ref 0.5–4.6)
LYMPHOCYTES NFR BLD: 27 % (ref 13–44)
MCH RBC QN AUTO: 24 PG (ref 26.1–32.9)
MCHC RBC AUTO-ENTMCNC: 30.5 G/DL (ref 31.4–35)
MCV RBC AUTO: 78.7 FL (ref 79.6–97.8)
MONOCYTES # BLD: 0.7 K/UL (ref 0.1–1.3)
MONOCYTES NFR BLD: 8 % (ref 4–12)
NEUTS SEG # BLD: 5.4 K/UL (ref 1.7–8.2)
NEUTS SEG NFR BLD: 61 % (ref 43–78)
NRBC # BLD: 0 K/UL (ref 0–0.2)
PLATELET # BLD AUTO: 328 K/UL (ref 150–450)
PMV BLD AUTO: 9.4 FL (ref 9.4–12.3)
POTASSIUM SERPL-SCNC: 3.5 MMOL/L (ref 3.5–5.1)
PROT SERPL-MCNC: 7.2 G/DL (ref 6.3–8.2)
RBC # BLD AUTO: 4.13 M/UL (ref 4.05–5.2)
SODIUM SERPL-SCNC: 142 MMOL/L (ref 136–145)
WBC # BLD AUTO: 8.9 K/UL (ref 4.3–11.1)

## 2020-11-01 PROCEDURE — 99283 EMERGENCY DEPT VISIT LOW MDM: CPT

## 2020-11-01 PROCEDURE — 85025 COMPLETE CBC W/AUTO DIFF WBC: CPT

## 2020-11-01 PROCEDURE — 83880 ASSAY OF NATRIURETIC PEPTIDE: CPT

## 2020-11-01 PROCEDURE — 80053 COMPREHEN METABOLIC PANEL: CPT

## 2020-11-01 PROCEDURE — 96374 THER/PROPH/DIAG INJ IV PUSH: CPT

## 2020-11-01 PROCEDURE — 74011250636 HC RX REV CODE- 250/636: Performed by: EMERGENCY MEDICINE

## 2020-11-01 PROCEDURE — 81003 URINALYSIS AUTO W/O SCOPE: CPT

## 2020-11-01 PROCEDURE — 81025 URINE PREGNANCY TEST: CPT

## 2020-11-01 RX ORDER — KETOROLAC TROMETHAMINE 30 MG/ML
30 INJECTION, SOLUTION INTRAMUSCULAR; INTRAVENOUS
Status: COMPLETED | OUTPATIENT
Start: 2020-11-01 | End: 2020-11-01

## 2020-11-01 RX ORDER — LISINOPRIL 10 MG/1
10 TABLET ORAL DAILY
Qty: 30 TAB | Refills: 0 | Status: SHIPPED | OUTPATIENT
Start: 2020-11-01 | End: 2020-12-01

## 2020-11-01 RX ORDER — LISINOPRIL 10 MG/1
10 TABLET ORAL DAILY
Qty: 30 TAB | Refills: 0 | OUTPATIENT
Start: 2020-11-01 | End: 2020-11-01

## 2020-11-01 RX ORDER — TRIAMTERENE/HYDROCHLOROTHIAZID 37.5-25 MG
1 TABLET ORAL DAILY
Qty: 30 TAB | Refills: 0 | Status: SHIPPED | OUTPATIENT
Start: 2020-11-01

## 2020-11-01 RX ADMIN — KETOROLAC TROMETHAMINE 30 MG: 30 INJECTION, SOLUTION INTRAMUSCULAR at 07:47

## 2020-11-01 NOTE — DISCHARGE INSTRUCTIONS
Patient Education        Leg and Ankle Edema: Care Instructions  Your Care Instructions  Swelling in the legs, ankles, and feet is called edema. It is common after you sit or stand for a while. Long plane flights or car rides often cause swelling in the legs and feet. You may also have swelling if you have to stand for long periods of time at your job. Problems with the veins in the legs (varicose veins) and changes in hormones can also cause swelling. Sometimes the swelling in the ankles and feet is caused by a more serious problem, such as heart failure, infection, blood clots, or liver or kidney disease. Follow-up care is a key part of your treatment and safety. Be sure to make and go to all appointments, and call your doctor if you are having problems. It's also a good idea to know your test results and keep a list of the medicines you take. How can you care for yourself at home? · If your doctor gave you medicine, take it as prescribed. Call your doctor if you think you are having a problem with your medicine. · Whenever you are resting, raise your legs up. Try to keep the swollen area higher than the level of your heart. · Take breaks from standing or sitting in one position. ? Walk around to increase the blood flow in your lower legs. ? Move your feet and ankles often while you stand, or tighten and relax your leg muscles. · Wear support stockings. Put them on in the morning, before swelling gets worse. · Eat a balanced diet. Lose weight if you need to. · Limit the amount of salt (sodium) in your diet. Salt holds fluid in the body and may increase swelling. When should you call for help? Call 911 anytime you think you may need emergency care. For example, call if:    · You have symptoms of a blood clot in your lung (called a pulmonary embolism). These may include:  ? Sudden chest pain. ? Trouble breathing. ? Coughing up blood.    Call your doctor now or seek immediate medical care if:    · You have signs of a blood clot, such as:  ? Pain in your calf, back of the knee, thigh, or groin. ? Redness and swelling in your leg or groin.     · You have symptoms of infection, such as:  ? Increased pain, swelling, warmth, or redness. ? Red streaks or pus. ? A fever. Watch closely for changes in your health, and be sure to contact your doctor if:    · Your swelling is getting worse.     · You have new or worsening pain in your legs.     · You do not get better as expected. Where can you learn more? Go to http://www.gray.com/  Enter Q011 in the search box to learn more about \"Leg and Ankle Edema: Care Instructions. \"  Current as of: June 26, 2019               Content Version: 12.6  © 8884-5301 [employer]. Care instructions adapted under license by [employer] (which disclaims liability or warranty for this information). If you have questions about a medical condition or this instruction, always ask your healthcare professional. [place] [address] any warranty or liability for your use of this information.

## 2020-11-01 NOTE — ED NOTES
I have reviewed discharge instructions with the patient. The patient verbalized understanding. Patient left ED via Discharge Method: ambulatory to Home with friend    Opportunity for questions and clarification provided. Patient given 2 scripts. To continue your aftercare when you leave the hospital, you may receive an automated call from our care team to check in on how you are doing. This is a free service and part of our promise to provide the best care and service to meet your aftercare needs.  If you have questions, or wish to unsubscribe from this service please call [phone]. Thank you for Choosing our [address] [place] [address] Emergency Department.

## 2020-11-01 NOTE — ED PROVIDER NOTES
Patient with a history of high blood pressure and thyroid issues. States recently released from [facility] and was not discharged home on any medications. Since Friday has had bilateral lower extremity edema with some pain coming with the swelling. No chest pain or shortness of breath. The history is provided by the patient. No  was used. Leg Swelling    This is a new problem. The current episode started 2 days ago. The problem occurs constantly. The problem has been gradually worsening. The pain is present in the left lower leg and right lower leg. The quality of the pain is described as aching. The pain is mild. Pertinent negatives include no numbness, full range of motion, no back pain and no neck pain. The symptoms are aggravated by palpation. She has tried nothing for the symptoms. Past Medical History:   Diagnosis Date    Acute pancreatitis     Anxiety     Placenta abruptio 2010    Occurred with her last pregnancy; Pt reports bleeding x 5 months during her pregnancy; Required blood transfusion.  Polysubstance abuse     marijuana and methamphetamine use    Tobacco dependence     UTI (urinary tract infection)        Past Surgical History:   Procedure Laterality Date    HX  SECTION      x4    HX CHOLECYSTECTOMY      HX TONSILLECTOMY      HX TUBAL LIGATION           Family History:   Problem Relation Age of Onset    Diabetes Other     Clotting Disorder Other         History of blood clot in mother and maternal uncle.     Hypertension Other        Social History     Socioeconomic History    Marital status: SINGLE     Spouse name: Not on file    Number of children: Not on file    Years of education: Not on file    Highest education level: Not on file   Occupational History    Not on file   Social Needs    Financial resource strain: Not on file    Food insecurity     Worry: Not on file     Inability: Not on file    Transportation needs     Medical: Not on file     Non-medical: Not on file   Tobacco Use    Smoking status: Current Every Day Smoker     Packs/day: 1.00     Years: 9.00     Pack years: 9.00     Types: Cigarettes    Smokeless tobacco: Never Used   Substance and Sexual Activity    Alcohol use: Yes     Comment: Reports occasional use    Drug use: Yes     Frequency: 1.0 times per week     Types: Marijuana, Methamphetamines    Sexual activity: Yes     Partners: Male     Birth control/protection: Surgical   Lifestyle    Physical activity     Days per week: Not on file     Minutes per session: Not on file    Stress: Not on file   Relationships    Social connections     Talks on phone: Not on file     Gets together: Not on file     Attends Orthodoxy service: Not on file     Active member of club or organization: Not on file     Attends meetings of clubs or organizations: Not on file     Relationship status: Not on file    Intimate partner violence     Fear of current or ex partner: Not on file     Emotionally abused: Not on file     Physically abused: Not on file     Forced sexual activity: Not on file   Other Topics Concern    Not on file   Social History Narrative    Not on file         ALLERGIES: Macrobid [nitrofurantoin monohyd/m-cryst]    Review of Systems   Constitutional: Negative for chills and fever. HENT: Negative for rhinorrhea and sore throat. Eyes: Negative for pain and redness. Respiratory: Negative for chest tightness, shortness of breath and wheezing. Cardiovascular: Positive for leg swelling. Negative for chest pain. Gastrointestinal: Negative for abdominal pain, diarrhea, nausea and vomiting. Musculoskeletal: Negative for back pain, gait problem, neck pain and neck stiffness. Skin: Negative for color change and rash. Neurological: Negative for weakness, numbness and headaches.        Vitals:    11/01/20 0729   BP: (!) 171/95   Pulse: 74   Resp: 16   Temp: 97.9 °F (36.6 °C)   SpO2: 99%   Weight: 127 kg (280 lb) Height: 5' 5\" (1.651 m)            Physical Exam  Constitutional:       Appearance: She is well-developed. She is obese. HENT:      Head: Normocephalic and atraumatic. Neck:      Musculoskeletal: Normal range of motion and neck supple. Cardiovascular:      Rate and Rhythm: Normal rate and regular rhythm. Pulmonary:      Effort: Pulmonary effort is normal. No respiratory distress. Breath sounds: Normal breath sounds. No wheezing. Abdominal:      General: Bowel sounds are normal.      Palpations: Abdomen is soft. Tenderness: There is no abdominal tenderness. Musculoskeletal: Normal range of motion. General: Tenderness (BLE) present. Right lower leg: Edema present. Left lower leg: Edema present. Skin:     General: Skin is warm and dry. Findings: No erythema. Neurological:      Mental Status: She is alert and oriented to person, place, and time. MDM  Number of Diagnoses or Management Options  Diagnosis management comments: No acute on blood work. Will discharge with refill of her medications.        Amount and/or Complexity of Data Reviewed  Clinical lab tests: ordered and reviewed  Tests in the medicine section of CPT®: ordered and reviewed    Patient Progress  Patient progress: stable         Procedures        Results Include:    Recent Results (from the past 24 hour(s))   CBC WITH AUTOMATED DIFF    Collection Time: 11/01/20  7:41 AM   Result Value Ref Range    WBC 8.9 4.3 - 11.1 K/uL    RBC 4.13 4.05 - 5.2 M/uL    HGB 9.9 (L) 11.7 - 15.4 g/dL    HCT 32.5 (L) 35.8 - 46.3 %    MCV 78.7 (L) 79.6 - 97.8 FL    MCH 24.0 (L) 26.1 - 32.9 PG    MCHC 30.5 (L) 31.4 - 35.0 g/dL    RDW 16.1 (H) 11.9 - 14.6 %    PLATELET 093 193 - 445 K/uL    MPV 9.4 9.4 - 12.3 FL    ABSOLUTE NRBC 0.00 0.0 - 0.2 K/uL    DF AUTOMATED      NEUTROPHILS 61 43 - 78 %    LYMPHOCYTES 27 13 - 44 %    MONOCYTES 8 4.0 - 12.0 %    EOSINOPHILS 3 0.5 - 7.8 %    BASOPHILS 1 0.0 - 2.0 %    IMMATURE GRANULOCYTES 0 0.0 - 5.0 %    ABS. NEUTROPHILS 5.4 1.7 - 8.2 K/UL    ABS. LYMPHOCYTES 2.4 0.5 - 4.6 K/UL    ABS. MONOCYTES 0.7 0.1 - 1.3 K/UL    ABS. EOSINOPHILS 0.2 0.0 - 0.8 K/UL    ABS. BASOPHILS 0.1 0.0 - 0.2 K/UL    ABS. IMM. GRANS. 0.0 0.0 - 0.5 K/UL   METABOLIC PANEL, COMPREHENSIVE    Collection Time: 11/01/20  7:41 AM   Result Value Ref Range    Sodium 142 136 - 145 mmol/L    Potassium 3.5 3.5 - 5.1 mmol/L    Chloride 110 (H) 98 - 107 mmol/L    CO2 25 21 - 32 mmol/L    Anion gap 7 7 - 16 mmol/L    Glucose 109 (H) 65 - 100 mg/dL    BUN 20 6 - 23 MG/DL    Creatinine 0.86 0.6 - 1.0 MG/DL    GFR est AA >60 >60 ml/min/1.73m2    GFR est non-AA >60 >60 ml/min/1.73m2    Calcium 8.5 8.3 - 10.4 MG/DL    Bilirubin, total 0.2 0.2 - 1.1 MG/DL    ALT (SGPT) 18 12 - 65 U/L    AST (SGOT) 14 (L) 15 - 37 U/L    Alk.  phosphatase 70 50 - 130 U/L    Protein, total 7.2 6.3 - 8.2 g/dL    Albumin 3.5 3.5 - 5.0 g/dL    Globulin 3.7 (H) 2.3 - 3.5 g/dL    A-G Ratio 0.9 (L) 1.2 - 3.5     NT-PRO BNP    Collection Time: 11/01/20  7:41 AM   Result Value Ref Range    NT pro-BNP 55 5 - 125 PG/ML

## 2020-11-01 NOTE — ED TRIAGE NOTES
Patient states recently released from [facility] and was not given rx for her meds. Patient is out of levothyroxine, lisinopril, polyethylene glycol, trazodone, triamteren and vitamin d3. Patient co bilateral leg and feet swelling.   Patient denies cough or congestion

## 2020-11-04 LAB — HCG UR QL: NEGATIVE

## 2023-06-20 ENCOUNTER — HOSPITAL ENCOUNTER (EMERGENCY)
Age: 33
Discharge: HOME HEALTH | End: 2023-06-20
Attending: EMERGENCY MEDICINE

## 2023-06-20 VITALS
BODY MASS INDEX: 35.82 KG/M2 | TEMPERATURE: 98.2 F | DIASTOLIC BLOOD PRESSURE: 111 MMHG | RESPIRATION RATE: 18 BRPM | OXYGEN SATURATION: 97 % | HEIGHT: 65 IN | SYSTOLIC BLOOD PRESSURE: 151 MMHG | HEART RATE: 97 BPM | WEIGHT: 215 LBS

## 2023-06-20 DIAGNOSIS — R51.9 ACUTE NONINTRACTABLE HEADACHE, UNSPECIFIED HEADACHE TYPE: ICD-10-CM

## 2023-06-20 DIAGNOSIS — Z76.0 ENCOUNTER FOR MEDICATION REFILL: Primary | ICD-10-CM

## 2023-06-20 PROCEDURE — 6360000002 HC RX W HCPCS

## 2023-06-20 PROCEDURE — 6370000000 HC RX 637 (ALT 250 FOR IP)

## 2023-06-20 PROCEDURE — 96372 THER/PROPH/DIAG INJ SC/IM: CPT

## 2023-06-20 PROCEDURE — 99284 EMERGENCY DEPT VISIT MOD MDM: CPT

## 2023-06-20 RX ORDER — TRIAMTERENE AND HYDROCHLOROTHIAZIDE 37.5; 25 MG/1; MG/1
1 TABLET ORAL
Status: DISCONTINUED | OUTPATIENT
Start: 2023-06-20 | End: 2023-06-20

## 2023-06-20 RX ORDER — LEVOTHYROXINE SODIUM 175 UG/1
175 TABLET ORAL DAILY
COMMUNITY
End: 2023-06-20 | Stop reason: SDUPTHER

## 2023-06-20 RX ORDER — TRIAMTERENE AND HYDROCHLOROTHIAZIDE 37.5; 25 MG/1; MG/1
1 TABLET ORAL DAILY
Qty: 30 TABLET | Refills: 0 | Status: SHIPPED | OUTPATIENT
Start: 2023-06-20

## 2023-06-20 RX ORDER — LISINOPRIL 20 MG/1
20 TABLET ORAL
Status: COMPLETED | OUTPATIENT
Start: 2023-06-20 | End: 2023-06-20

## 2023-06-20 RX ORDER — LEVOTHYROXINE SODIUM 175 UG/1
175 TABLET ORAL DAILY
Qty: 30 TABLET | Refills: 0 | Status: SHIPPED | OUTPATIENT
Start: 2023-06-20

## 2023-06-20 RX ORDER — LISINOPRIL 20 MG/1
20 TABLET ORAL 2 TIMES DAILY
Qty: 30 TABLET | Refills: 0 | Status: SHIPPED | OUTPATIENT
Start: 2023-06-20

## 2023-06-20 RX ORDER — KETOROLAC TROMETHAMINE 30 MG/ML
15 INJECTION, SOLUTION INTRAMUSCULAR; INTRAVENOUS
Status: COMPLETED | OUTPATIENT
Start: 2023-06-20 | End: 2023-06-20

## 2023-06-20 RX ORDER — LISINOPRIL 20 MG/1
20 TABLET ORAL 2 TIMES DAILY
COMMUNITY
End: 2023-06-20 | Stop reason: SDUPTHER

## 2023-06-20 RX ADMIN — KETOROLAC TROMETHAMINE 15 MG: 30 INJECTION, SOLUTION INTRAMUSCULAR; INTRAVENOUS at 11:53

## 2023-06-20 RX ADMIN — LISINOPRIL 20 MG: 20 TABLET ORAL at 11:53

## 2023-06-20 ASSESSMENT — ENCOUNTER SYMPTOMS
VOMITING: 0
CHEST TIGHTNESS: 0
ABDOMINAL PAIN: 0
DIARRHEA: 0
COLOR CHANGE: 0
SHORTNESS OF BREATH: 0
NAUSEA: 0

## 2023-06-20 ASSESSMENT — PAIN - FUNCTIONAL ASSESSMENT: PAIN_FUNCTIONAL_ASSESSMENT: 0-10

## 2023-06-20 ASSESSMENT — PAIN SCALES - GENERAL: PAINLEVEL_OUTOF10: 3

## 2023-06-20 NOTE — DISCHARGE INSTRUCTIONS
You were given a dose of your home medications today. Refills have been provided. Plan to follow-up with your primary doctor once established for continuation. Please return with any worsening symptoms or concerns.

## 2023-06-20 NOTE — ED PROVIDER NOTES
complaints. The history is provided by the patient. Review of Systems   Constitutional:  Negative for chills, fatigue and fever. Eyes:  Negative for visual disturbance. Respiratory:  Negative for chest tightness and shortness of breath. Cardiovascular:  Negative for chest pain and palpitations. Gastrointestinal:  Negative for abdominal pain, diarrhea, nausea and vomiting. Genitourinary:  Negative for dysuria. Musculoskeletal:  Negative for arthralgias and myalgias. Skin:  Negative for color change and wound. Neurological:  Positive for headaches. Negative for dizziness, syncope, weakness and light-headedness. All other systems reviewed and are negative. Physical Exam     Vitals signs and nursing note reviewed:  Vitals:    06/20/23 1115 06/20/23 1153   BP: (!) 157/103 (!) 151/111   Pulse: 97    Resp: 18    Temp: 98.2 °F (36.8 °C)    TempSrc: Oral    SpO2: 97%    Weight: 215 lb (97.5 kg)    Height: 5' 5\" (1.651 m)       Physical Exam  Vitals and nursing note reviewed. Constitutional:       General: She is not in acute distress. Appearance: Normal appearance. She is obese. She is not ill-appearing. HENT:      Head: Normocephalic and atraumatic. Right Ear: External ear normal.      Left Ear: External ear normal.      Nose: Nose normal.   Eyes:      General: No scleral icterus. Right eye: No discharge. Left eye: No discharge. Extraocular Movements: Extraocular movements intact. Conjunctiva/sclera: Conjunctivae normal.   Cardiovascular:      Rate and Rhythm: Normal rate and regular rhythm. Pulses: Normal pulses. Heart sounds: Normal heart sounds. Pulmonary:      Effort: Pulmonary effort is normal.      Breath sounds: Normal breath sounds. Abdominal:      General: Abdomen is flat. Palpations: Abdomen is soft. Tenderness: There is no abdominal tenderness. Musculoskeletal:         General: Normal range of motion.       Cervical back:

## 2023-06-20 NOTE — ED TRIAGE NOTES
Pt arrives for complaints of needing script refills for HTN and hypothyroidism. States has not had meds in over a month. Denies angina or SOB at this time.  Follows outpt with a free clinic

## 2023-06-20 NOTE — ED NOTES
I have reviewed discharge instructions with the patient. The patient verbalized understanding. Patient left ED via Discharge Method: ambulatory to Home with self    Opportunity for questions and clarification provided. Patient given 3 scripts. To continue your aftercare when you leave the hospital, you may receive an automated call from our care team to check in on how you are doing. This is a free service and part of our promise to provide the best care and service to meet your aftercare needs.  If you have questions, or wish to unsubscribe from this service please call [phone]. Thank you for Choosing our [facility] Emergency Department.         Marii Berumen RN  06/20/23 6440

## 2024-02-09 ENCOUNTER — HOSPITAL ENCOUNTER (EMERGENCY)
Age: 34
Discharge: HOME | End: 2024-02-09

## 2024-02-09 VITALS
SYSTOLIC BLOOD PRESSURE: 167 MMHG | TEMPERATURE: 98 F | WEIGHT: 220 LBS | HEART RATE: 94 BPM | OXYGEN SATURATION: 100 % | HEIGHT: 65 IN | DIASTOLIC BLOOD PRESSURE: 98 MMHG | RESPIRATION RATE: 14 BRPM | BODY MASS INDEX: 36.65 KG/M2

## 2024-02-09 DIAGNOSIS — N75.1 BARTHOLIN'S GLAND ABSCESS: Primary | ICD-10-CM

## 2024-02-09 PROCEDURE — 99284 EMERGENCY DEPT VISIT MOD MDM: CPT

## 2024-02-09 PROCEDURE — 56420 I&D BARTHOLINS GLAND ABSCESS: CPT

## 2024-02-09 PROCEDURE — 6370000000 HC RX 637 (ALT 250 FOR IP): Performed by: PHYSICIAN ASSISTANT

## 2024-02-09 PROCEDURE — 6360000002 HC RX W HCPCS: Performed by: PHYSICIAN ASSISTANT

## 2024-02-09 PROCEDURE — 2500000003 HC RX 250 WO HCPCS: Performed by: PHYSICIAN ASSISTANT

## 2024-02-09 PROCEDURE — 96372 THER/PROPH/DIAG INJ SC/IM: CPT

## 2024-02-09 RX ORDER — KETOROLAC TROMETHAMINE 30 MG/ML
30 INJECTION, SOLUTION INTRAMUSCULAR; INTRAVENOUS ONCE
Status: COMPLETED | OUTPATIENT
Start: 2024-02-09 | End: 2024-02-09

## 2024-02-09 RX ORDER — TRIAMTERENE AND HYDROCHLOROTHIAZIDE 37.5; 25 MG/1; MG/1
1 TABLET ORAL DAILY
Qty: 30 TABLET | Refills: 0 | Status: SHIPPED | OUTPATIENT
Start: 2024-02-09

## 2024-02-09 RX ORDER — CLINDAMYCIN HYDROCHLORIDE 300 MG/1
300 CAPSULE ORAL 3 TIMES DAILY
Qty: 21 CAPSULE | Refills: 0 | Status: SHIPPED | OUTPATIENT
Start: 2024-02-09 | End: 2024-02-16

## 2024-02-09 RX ORDER — LEVOTHYROXINE SODIUM 175 UG/1
175 TABLET ORAL DAILY
Qty: 30 TABLET | Refills: 0 | Status: SHIPPED | OUTPATIENT
Start: 2024-02-09

## 2024-02-09 RX ORDER — LORAZEPAM 0.5 MG/1
0.5 TABLET ORAL ONCE
Status: COMPLETED | OUTPATIENT
Start: 2024-02-09 | End: 2024-02-09

## 2024-02-09 RX ORDER — LIDOCAINE HYDROCHLORIDE AND EPINEPHRINE 10; 10 MG/ML; UG/ML
20 INJECTION, SOLUTION INFILTRATION; PERINEURAL ONCE
Status: COMPLETED | OUTPATIENT
Start: 2024-02-09 | End: 2024-02-09

## 2024-02-09 RX ADMIN — LIDOCAINE HYDROCHLORIDE,EPINEPHRINE BITARTRATE 20 ML: 10; .01 INJECTION, SOLUTION INFILTRATION; PERINEURAL at 11:40

## 2024-02-09 RX ADMIN — KETOROLAC TROMETHAMINE 30 MG: 30 INJECTION, SOLUTION INTRAMUSCULAR; INTRAVENOUS at 10:39

## 2024-02-09 RX ADMIN — LORAZEPAM 0.5 MG: 0.5 TABLET ORAL at 10:42

## 2024-02-09 ASSESSMENT — PAIN - FUNCTIONAL ASSESSMENT: PAIN_FUNCTIONAL_ASSESSMENT: 0-10

## 2024-02-09 ASSESSMENT — PAIN SCALES - GENERAL
PAINLEVEL_OUTOF10: 8
PAINLEVEL_OUTOF10: 8

## 2024-02-09 ASSESSMENT — ENCOUNTER SYMPTOMS
EYE REDNESS: 0
NAUSEA: 0
VOMITING: 0
SHORTNESS OF BREATH: 0
CHEST TIGHTNESS: 0
RHINORRHEA: 0
SORE THROAT: 0
DIARRHEA: 0
COUGH: 0
BACK PAIN: 0
ABDOMINAL DISTENTION: 0

## 2024-02-09 ASSESSMENT — PAIN DESCRIPTION - LOCATION
LOCATION: GROIN
LOCATION: VAGINA

## 2024-02-09 ASSESSMENT — PAIN DESCRIPTION - DESCRIPTORS: DESCRIPTORS: PRESSURE;DISCOMFORT

## 2024-02-09 NOTE — ED NOTES
I have reviewed discharge instructions with the patient.  The patient verbalized understanding.    Patient left ED via Discharge Method: ambulatory to Home with self and daughter.    Opportunity for questions and clarification provided.       Patient given 3 scripts.         To continue your aftercare when you leave the hospital, you may receive an automated call from our care team to check in on how you are doing.  This is a free service and part of our promise to provide the best care and service to meet your aftercare needs.” If you have questions, or wish to unsubscribe from this service please call [phone].  Thank you for Choosing our [facility] Emergency Department.

## 2024-02-09 NOTE — ED PROVIDER NOTES
Emergency Department Provider Note       PCP: None, None   Age: 33 y.o.   Sex: female     DISPOSITION Decision To Discharge 02/09/2024 11:04:43 AM       ICD-10-CM    1. Bartholin's gland abscess  N75.1 Ambulatory referral to Obstetrics / Gynecology          Medical Decision Making     Complexity of Problems Addressed:  Acute illness    Data Reviewed and Analyzed:  I independently ordered and reviewed each unique test.             Discussion of management or test interpretation.  Patient is a 33-year-old female presenting with 3 days of worsening vaginal pain and swelling.  States she has had abscesses down here in the past and believes she needs incision and drainage.  She is afebrile, is pretty anxious on exam, mildly hypertensive otherwise vital signs stable.  She has tenderness to palpation of fluctuant mass consistent with a Bartholin gland abscess on the left side.  Incision and drainage was performed as discussed in procedure note.  She was given referral to OB/GYN, Dr. Oh, for follow-up.  She will be discharged home with 7-day course of clindamycin.  Did include 30-day refill of her levothyroxine and Maxide medications gave information for follow-up with new resident services in order to establish outpatient care for chronic conditions.  Discussed reasons to return to the ED.  Patient agreeable plan            Risk of Complications and/or Morbidity of Patient Management:  Over the counter drug management performed.  Prescription drug management performed.  Shared medical decision making was utilized in creating the patients health plan today.        History       Patient is a 33-year-old female with history of thyroid disease and hypertension who presents with concern for groin abscess and medication refill.  She states she has been out of her Synthroid and Maxide for the last 2 months.  She moved here from [facility] and has yet to establish care.  Additionally she has had worsening pain and

## 2024-02-09 NOTE — DISCHARGE INSTRUCTIONS
We are sending you home with clindamycin which is an antibiotic please take it 3 times a day for the next 5 days.  Do recommend eating yogurt or taking probiotic while on antibiotics.  We did place outpatient referral for follow-up with GYN, should follow-up with you early next week for recheck of the abscess.  Return to the ER with any worsening pain, fevers or concerning symptoms.

## 2024-02-09 NOTE — ED TRIAGE NOTES
Pt reports out of bp medication and thyroid medication x 2 months pcp in [place]. Pt reports groin abscess x 3 days.

## 2024-04-14 ENCOUNTER — HOSPITAL ENCOUNTER (EMERGENCY)
Age: 34
Discharge: HOME | End: 2024-04-14
Attending: EMERGENCY MEDICINE
Payer: COMMERCIAL

## 2024-04-14 ENCOUNTER — APPOINTMENT (OUTPATIENT)
Dept: CT IMAGING | Age: 34
End: 2024-04-14
Payer: COMMERCIAL

## 2024-04-14 VITALS
WEIGHT: 225 LBS | TEMPERATURE: 97.6 F | OXYGEN SATURATION: 99 % | HEART RATE: 94 BPM | SYSTOLIC BLOOD PRESSURE: 169 MMHG | DIASTOLIC BLOOD PRESSURE: 117 MMHG | RESPIRATION RATE: 16 BRPM | BODY MASS INDEX: 37.49 KG/M2 | HEIGHT: 65 IN

## 2024-04-14 DIAGNOSIS — Z91.148 NONCOMPLIANCE WITH MEDICATION REGIMEN: ICD-10-CM

## 2024-04-14 DIAGNOSIS — I10 HYPERTENSION, UNSPECIFIED TYPE: Primary | ICD-10-CM

## 2024-04-14 LAB
BILIRUB UR QL: NEGATIVE
GLUCOSE UR QL STRIP.AUTO: NEGATIVE MG/DL
KETONES UR-MCNC: NEGATIVE MG/DL
LEUKOCYTE ESTERASE UR QL STRIP: NEGATIVE
NITRITE UR QL: NEGATIVE
PH UR: 7 (ref 5–9)
PROT UR QL: NEGATIVE MG/DL
RBC # UR STRIP: NEGATIVE
SERVICE CMNT-IMP: ABNORMAL
SP GR UR: 1.02 (ref 1–1.02)
UROBILINOGEN UR QL: 0.2 EU/DL (ref 0.2–1)

## 2024-04-14 PROCEDURE — 81003 URINALYSIS AUTO W/O SCOPE: CPT

## 2024-04-14 PROCEDURE — 99284 EMERGENCY DEPT VISIT MOD MDM: CPT

## 2024-04-14 PROCEDURE — 6360000002 HC RX W HCPCS: Performed by: EMERGENCY MEDICINE

## 2024-04-14 PROCEDURE — 6370000000 HC RX 637 (ALT 250 FOR IP): Performed by: EMERGENCY MEDICINE

## 2024-04-14 PROCEDURE — 70450 CT HEAD/BRAIN W/O DYE: CPT

## 2024-04-14 PROCEDURE — 96374 THER/PROPH/DIAG INJ IV PUSH: CPT

## 2024-04-14 RX ORDER — LEVOTHYROXINE SODIUM 175 UG/1
168 TABLET ORAL DAILY
Qty: 30 TABLET | Refills: 3 | Status: SHIPPED | OUTPATIENT
Start: 2024-04-14 | End: 2024-08-12

## 2024-04-14 RX ORDER — CLONIDINE HYDROCHLORIDE 0.1 MG/1
0.2 TABLET ORAL
Status: COMPLETED | OUTPATIENT
Start: 2024-04-14 | End: 2024-04-14

## 2024-04-14 RX ORDER — TRIAMTERENE AND HYDROCHLOROTHIAZIDE 37.5; 25 MG/1; MG/1
1 TABLET ORAL DAILY
Qty: 30 TABLET | Refills: 3 | Status: SHIPPED | OUTPATIENT
Start: 2024-04-14 | End: 2024-08-12

## 2024-04-14 RX ORDER — MORPHINE SULFATE 4 MG/ML
4 INJECTION, SOLUTION INTRAMUSCULAR; INTRAVENOUS
Status: COMPLETED | OUTPATIENT
Start: 2024-04-14 | End: 2024-04-14

## 2024-04-14 RX ORDER — LISINOPRIL 20 MG/1
20 TABLET ORAL 2 TIMES DAILY
Qty: 60 TABLET | Refills: 3 | Status: SHIPPED | OUTPATIENT
Start: 2024-04-14 | End: 2024-08-12

## 2024-04-14 RX ADMIN — MORPHINE SULFATE 4 MG: 4 INJECTION INTRAVENOUS at 09:49

## 2024-04-14 RX ADMIN — CLONIDINE HYDROCHLORIDE 0.2 MG: 0.1 TABLET ORAL at 09:17

## 2024-04-14 ASSESSMENT — PAIN SCALES - GENERAL
PAINLEVEL_OUTOF10: 7
PAINLEVEL_OUTOF10: 5

## 2024-04-14 ASSESSMENT — PAIN - FUNCTIONAL ASSESSMENT: PAIN_FUNCTIONAL_ASSESSMENT: 0-10

## 2024-04-14 NOTE — ED NOTES
Patient mobility status  with no difficulty. Provider aware     I have reviewed discharge instructions with the patient.  The patient verbalized understanding.    Patient left ED via Discharge Method: ambulatory to Home with FRIEND.    Opportunity for questions and clarification provided.     Patient given 3 scripts.

## 2024-04-14 NOTE — ED PROVIDER NOTES
back: Normal range of motion and neck supple.   Skin:     General: Skin is warm.      Capillary Refill: Capillary refill takes less than 2 seconds.   Neurological:      General: No focal deficit present.      Mental Status: She is alert and oriented to person, place, and time. Mental status is at baseline.   Psychiatric:         Mood and Affect: Mood normal.         Behavior: Behavior normal.         Thought Content: Thought content normal.         Judgment: Judgment normal.        Procedures     Procedures    Orders Placed This Encounter   Procedures    CT Head W/O Contrast    POCT Urine Dipstick    POCT Urinalysis no Micro        Medications given during this emergency department visit:  Medications   cloNIDine (CATAPRES) tablet 0.2 mg (0.2 mg Oral Given 24)   morphine sulfate (PF) injection 4 mg (4 mg IntraVENous Given 2459)       Current Discharge Medication List           Past Medical History:   Diagnosis Date    Acute pancreatitis     Anxiety     Hypertension     Hypothyroid     Placenta abruptio 2010    Occurred with her last pregnancy; Pt reports bleeding x 5 months during her pregnancy; Required blood transfusion.    Polysubstance abuse (HCC)     marijuana and methamphetamine use    Tobacco dependence     UTI (urinary tract infection)         Past Surgical History:   Procedure Laterality Date     SECTION      x4    CHOLECYSTECTOMY      TONSILLECTOMY      TUBAL LIGATION          Social History     Socioeconomic History    Marital status: Single     Spouse name: None    Number of children: None    Years of education: None    Highest education level: None   Tobacco Use    Smoking status: Every Day     Current packs/day: 1.00     Types: Cigarettes    Smokeless tobacco: Never   Vaping Use    Vaping Use: Some days   Substance and Sexual Activity    Alcohol use: Not Currently    Drug use: Yes     Frequency: 1.0 times per week     Types: Marijuana (Weed), Methamphetamines (Crystal Meth)

## 2024-04-14 NOTE — ED TRIAGE NOTES
Patient reports she has been out of medications greater than one week. Patient reports she was hypertensive at home. Patient reports head ache and pain behind left eye. Patient ambulatory without difficulty to room.